# Patient Record
Sex: MALE | Race: WHITE
[De-identification: names, ages, dates, MRNs, and addresses within clinical notes are randomized per-mention and may not be internally consistent; named-entity substitution may affect disease eponyms.]

---

## 2017-05-30 ENCOUNTER — HOSPITAL ENCOUNTER (EMERGENCY)
Dept: HOSPITAL 25 - UCCORT | Age: 52
Discharge: HOME | End: 2017-05-30
Payer: COMMERCIAL

## 2017-05-30 VITALS — DIASTOLIC BLOOD PRESSURE: 78 MMHG | SYSTOLIC BLOOD PRESSURE: 120 MMHG
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
## 2017-05-30 DIAGNOSIS — S83.92XA: Primary | ICD-10-CM

## 2017-05-30 PROCEDURE — 99212 OFFICE O/P EST SF 10 MIN: CPT

## 2017-05-30 PROCEDURE — G0463 HOSPITAL OUTPT CLINIC VISIT: HCPCS

## 2017-05-30 NOTE — UC
Knee Pain HPI





- HPI Summary


HPI Summary: 





LEFT KNEE PAIN AND SWELLING X 6 DAYS 


INJURY TO THE LEFT KNEE AS HE WAS WORKING ON THE ROOF


STEPPED OF AND TWISTED HIS LEFT KNEE, SEVER PAIN MEDIAL KNEE, 





- History of Current Complaint


Chief Complaint: UCLowerExtremity


Stated Complaint: LEFT KNEE INJURY


Time Seen by Provider: 05/30/17 09:34


Hx Obtained From: Patient


Onset/Duration: Sudden Onset, Lasting Days - 6, Still Present


Severity Initially: Moderate


Severity Currently: Moderate


Character: Aching, Stiffness


Aggravating Factor(s): Movement, Weight Bearing, Prolonged Standing, Stairs


Alleviating Factor(s): Rest, Cold


Associated Signs And Symptoms: Positive: Swelling, Weakness.  Negative: Redness

, Bruising, Fever, Numbness





- Allergies/Home Medications


Allergies/Adverse Reactions: 


 Allergies











Allergy/AdvReac Type Severity Reaction Status Date / Time


 


Bee Venom Allergy  Shortness Verified 05/30/17 09:21





   of Breath  


 


Oxycodone Allergy  Unknown Verified 05/30/17 09:21





   Reaction  





   Details  











Home Medications: 


 Home Medications





LevoCETirizine TAB (NF) [Xyzal TAB (NF)] 5 mg PO DAILY 05/30/17 [History 

Confirmed 05/30/17]


Montelukast Sodium TAB* [Singulair TAB*] 10 mg PO DAILY 05/30/17 [History 

Confirmed 05/30/17]











PMH/Surg Hx/FS Hx/Imm Hx


Endocrine History Of: 


   Denies: Diabetes


Cardiovascular History Of: Reports: Hypertension


   Denies: Pacemaker/ICD





- Surgical History


Surgical History: Yes


Surgery Procedure, Year, and Place: CSP SURGERY SPRING 2011, L5 S1 SURGERY X2, 

DEVIATED SEPTUM AND SINUS WORK, LEFT ELBOW 1990





- Family History


Known Family History: 


   Negative: Diabetes





- Social History


Alcohol Use: None


Substance Use Type: None


Smoking Status (MU): Former Smoker


When Did the Patient Quit Smoking/Using Tobacco: 16 years ago





Review of Systems


Constitutional: Negative


Skin: Negative


Eyes: Negative


ENT: Negative


Respiratory: Negative


Cardiovascular: Negative


Gastrointestinal: Negative


Genitourinary: Negative


Motor: Negative


Neurovascular: Negative


Musculoskeletal: Other: - LEFT KNEE PAIN


Neurological: Negative


Psychological: Negative


All Other Systems Reviewed And Are Negative: Yes





Physical Exam


Triage Information Reviewed: Yes


Appearance: Well-Appearing, No Pain Distress, Well-Nourished


Vital Signs: 


 Initial Vital Signs











Temp  97.7 F   05/30/17 09:24


 


Pulse  72   05/30/17 09:24


 


Resp  16   05/30/17 09:24


 


BP  120/78   05/30/17 09:24


 


Pulse Ox  96   05/30/17 09:24











Eye Exam: Normal


Eyes: Positive: Conjunctiva Clear


ENT: Positive: Normal ENT inspection, Hearing grossly normal, Pharynx normal


Neck exam: Normal


Neck: Positive: Supple, Nontender, No Lymphadenopathy


Respiratory: Positive: Chest non-tender, Lungs clear, Normal breath sounds, No 

respiratory distress


Cardiovascular: Positive: RRR, No Murmur, Pulses Normal


Abdominal Exam: Normal


Musculoskeletal: Positive: Other: - LEFT KNEE : + SWELLING , MILD EFFUSION, +

TENDERNESS MEDIAL JOINT LINE, LIMITED ROM ON FLESION AND EXTENSION





Knee Pain Course/Dx





- Differential Dx/Diagnosis


Provider Diagnoses: LEFT KNEE PAIN.  LEFT KNEE SPRAIN





Discharge





- Discharge Plan


Condition: Stable


Disposition: HOME


Patient Education Materials:  Swollen Knee Joint (ED)


Referrals: 


Scott Tian MD [Medical Doctor] - As Soon As Possible


No Primary Care Phys,NOPCP [Primary Care Provider] -

## 2017-05-30 NOTE — RAD
INDICATION: Left knee injury.



TECHNIQUE: 4 views of the left knee were obtained.



FINDINGS:  The bones are in normal alignment. No joint effusion or fracture is seen. 

Joint spaces appear maintained.



IMPRESSION:  NO EVIDENCE FOR FRACTURE.

## 2018-07-17 ENCOUNTER — HOSPITAL ENCOUNTER (EMERGENCY)
Dept: HOSPITAL 25 - UCCORT | Age: 53
Discharge: HOME | End: 2018-07-17
Payer: COMMERCIAL

## 2018-07-17 VITALS — DIASTOLIC BLOOD PRESSURE: 82 MMHG | SYSTOLIC BLOOD PRESSURE: 115 MMHG

## 2018-07-17 DIAGNOSIS — I10: ICD-10-CM

## 2018-07-17 DIAGNOSIS — Z18.89: ICD-10-CM

## 2018-07-17 DIAGNOSIS — H02.814: ICD-10-CM

## 2018-07-17 DIAGNOSIS — Z87.891: ICD-10-CM
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
## 2018-07-17 DIAGNOSIS — S00.251A: ICD-10-CM

## 2018-07-17 DIAGNOSIS — T15.02XA: Primary | ICD-10-CM
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
## 2018-07-17 PROCEDURE — 90715 TDAP VACCINE 7 YRS/> IM: CPT

## 2018-07-17 PROCEDURE — G0463 HOSPITAL OUTPT CLINIC VISIT: HCPCS

## 2018-07-17 PROCEDURE — 99212 OFFICE O/P EST SF 10 MIN: CPT

## 2018-07-17 NOTE — UC
Eye Complaint HPI





- HPI Summary


HPI Summary: 





51 yo male with left eye pain that started this AM upon arising


FB sensation/photophia and tearing


Td >20 yrs ago





- History of Current Complaint


Chief Complaint: UCEye


Stated Complaint: LEFT EYE COMPLAINT


Time Seen by Provider: 07/17/18 14:26


Hx Obtained From: Patient


Onset/Duration: Sudden Onset, Lasting Hours


Timing: Constant


Severity Initially: Mild


Severity Currently: Moderate


Pain Intensity: 6


Pain Scale Used: 0-10 Numeric


Location of Injury: Conjunctiva, Eye Lid (upper)


Character: Foreign Body Sensation


Aggravating Factor(s): Nothing


Alleviating Factor(s): Darkness


Associated Signs And Symptoms: Positive: Photophobia, Drainage (Clear)


Eyes: 


  __________________________














  __________________________





 1 - flourescein uptake








- Risk Factors


Penetrating Injury Risk Factor: Negative


Globe Rupture Risk Factors: Negative


Acute Glaucoma Risk Factors: Negative


Optic Artery Occlusion Risk Factors: Negative





- Allergies/Home Medications


Allergies/Adverse Reactions: 


 Allergies











Allergy/AdvReac Type Severity Reaction Status Date / Time


 


oxycodone AdvReac  hypertensio Verified 07/17/18 14:29





   n  














PMH/Surg Hx/FS Hx/Imm Hx


Previously Healthy: Yes - Hx chronic back issues





- Surgical History


Surgical History: Yes


Surgery Procedure, Year, and Place: CSP SURGERY SPRING 2011, L5 S1 SURGERY X2, 

DEVIATED SEPTUM AND SINUS WORK, LEFT ELBOW 1999.  C6-7 REMOVED AND REPLACED 

WITH BONES AND HARDWARE 2010





- Family History


Known Family History: Positive: Hypertension


   Negative: Diabetes





- Social History


Alcohol Use: Rare


Substance Use Type: None


Smoking Status (MU): Former Smoker


Amount Used/How Often: 1 ppd


Length of Time of Smoking/Using Tobacco: 30+ yrs


Have You Smoked in the Last Year: No


When Did the Patient Quit Smoking/Using Tobacco: 2002





Review of Systems


Constitutional: Negative


Skin: Negative


Eyes: Drainage - tearing, Photophobia


ENT: Negative


Respiratory: Negative


Cardiovascular: Negative


Gastrointestinal: Negative


Genitourinary: Negative


Motor: Negative


Neurovascular: Negative


Musculoskeletal: Negative


Neurological: Negative


Psychological: Negative


Is Patient Immunocompromised?: No


All Other Systems Reviewed And Are Negative: Yes





Physical Exam


Triage Information Reviewed: Yes


Appearance: Well-Appearing, No Pain Distress, Well-Nourished


Vital Signs: 


 Initial Vital Signs











Temp  98 F   07/17/18 14:21


 


Pulse  65   07/17/18 14:21


 


Resp  16   07/17/18 14:21


 


BP  115/82   07/17/18 14:21


 


Pulse Ox  99   07/17/18 14:21











Eyes: Positive: Conjunctiva Clear, Other: - tearing left eye/n o FB noted on 

cornea/upper lid everted and FB noted- removed with q tip, (+) staining defect 

as noted in image


ENT: Positive: Hearing grossly normal.  Negative: Nasal congestion, Nasal 

drainage, Muffled voice, Hoarse voice


Neck: Positive: Supple, Nontender


Respiratory: Positive: Lungs clear, Normal breath sounds, No respiratory 

distress, No accessory muscle use


Cardiovascular: Positive: RRR, No Murmur


Musculoskeletal: Positive: ROM Intact, No Edema


Neurological: Positive: Alert


Psychological Exam: Normal


Skin Exam: Normal





Procedures





- Eye Procedure


  ** Right


Alcaine Drops Administered: Yes - 2 drops tetracaine left eye


Eye FB Removal: removal w/ cotton swab - minute FB under left upper eyelid





Eye Complaint Course/Dx





- Differential Dx/Diagnosis


Provider Diagnoses: left corneal abrasion.  foreign body removal (under left 

upper eyelid)





Discharge





- Sign-Out/Discharge


Documenting (check all that apply): Patient Departure





- Discharge Plan


Condition: Stable


Disposition: HOME


Prescriptions: 


Erythromycin OPHTH.OINT* [Ilotycin OPHTH.OINT*] 1 applic LEFT EYE BEDTIME 3 

Days #1 ophth.oint


Polymyx/Trimethoprim OPTH* [Polytrim OPHTH*] 1 - 2 drop LEFT EYE QID 3 Days #1 

btl


Patient Education Materials:  Eye Foreign Body (ED), Corneal Abrasion (ED)


Referrals: 


Everette Linn MD [Medical Doctor] - 1 Day


Irais Charles MD [Medical Doctor] - 1 Day


Additional Instructions: 


you had a foreign body under your left upper eyelid


as a result you developed multiple abrasions to your left corneal


I suggest you get rechecked by an eye specialist tomorrow unless you are 

completely better





You got a tetanus shot today





- Billing Disposition and Condition


Condition: STABLE


Disposition: Home

## 2019-02-01 ENCOUNTER — HOSPITAL ENCOUNTER (EMERGENCY)
Dept: HOSPITAL 25 - UCCORT | Age: 54
Discharge: HOME | End: 2019-02-01
Payer: COMMERCIAL

## 2019-02-01 VITALS — SYSTOLIC BLOOD PRESSURE: 129 MMHG | DIASTOLIC BLOOD PRESSURE: 77 MMHG

## 2019-02-01 DIAGNOSIS — S16.1XXA: Primary | ICD-10-CM
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
## 2019-02-01 DIAGNOSIS — S50.02XA: ICD-10-CM

## 2019-02-01 DIAGNOSIS — R51: ICD-10-CM

## 2019-02-01 DIAGNOSIS — Z88.5: ICD-10-CM

## 2019-02-01 DIAGNOSIS — Z87.891: ICD-10-CM

## 2019-02-01 PROCEDURE — 99212 OFFICE O/P EST SF 10 MIN: CPT

## 2019-02-01 PROCEDURE — 72125 CT NECK SPINE W/O DYE: CPT

## 2019-02-01 PROCEDURE — G0463 HOSPITAL OUTPT CLINIC VISIT: HCPCS

## 2019-02-01 PROCEDURE — 70450 CT HEAD/BRAIN W/O DYE: CPT

## 2019-02-01 NOTE — UC
Minor Trauma HPI





- HPI Summary


HPI Summary: 


53-year-old male presents with complaints of headache, feeling "off balance", 

posterior neck pain, intermittent bilateral upper extremity numbness, and left 

elbow pain after accidentally falling approximately 4 feet from a ladder at 

work 4 days ago.  States he did not hit his head or lose consciousness.  

Reports ladder tipped over and some unstable ground and he fell onto his left 

elbow and then his back onto snow-covered ground.  Denies visual disturbances, 

slurred or difficulty speaking, weakness of his extremities, chest pain, 

palpitations, shortness of breath, abdominal pain, nausea, vomiting, or other 

injury.








- History of Current Complaint


Chief Complaint: UCGeneralIllness


Stated Complaint: WC-SP FALL,HA,NUMBNESS IN ARMS, BACK PAIN


Time Seen by Provider: 19 13:19


Hx Obtained From: Patient


Pain Intensity: 9





- Allergies/Home Medications


Allergies/Adverse Reactions: 


 Allergies











Allergy/AdvReac Type Severity Reaction Status Date / Time


 


oxycodone AdvReac  hypertensio Verified 19 12:52





   n  











Home Medications: 


 Home Medications





Naproxen Sodium [Aleve] 220 mg PO DAILY PRN 19 [History Confirmed 19

]











PMH/Surg Hx/FS Hx/Imm Hx





- Additional Past Medical History


Additional PMH: 


Seasonal allergies








- Surgical History


Surgical History: Yes


Surgery Procedure, Year, and Place: CSP SURGERY SPRING 2011, L5 S1 SURGERY X2, 

DEVIATED SEPTUM AND SINUS WORK, LEFT ELBOW .  C6-7 REMOVED AND REPLACED 

WITH BONES AND HARDWARE 





- Family History


Known Family History: Positive: Hypertension


   Negative: Diabetes





- Social History


Occupation: Employed Full-time


Lives: With Family


Alcohol Use: None


Substance Use Type: None


Smoking Status (MU): Former Smoker


Amount Used/How Often: 1 ppd


Length of Time of Smoking/Using Tobacco: 30+ yrs


Have You Smoked in the Last Year: No


When Did the Patient Quit Smoking/Using Tobacco: 





Review of Systems


All Other Systems Reviewed And Are Negative: Yes


Constitutional: Negative: Fever, Chills


Skin: Negative: Bruising


Eyes: Negative: Blurred Vision, Diplopia, Photophobia


Respiratory: Negative: Shortness Of Breath, Cough


Cardiovascular: Negative: Palpitations, Chest Pain


Gastrointestinal: Negative: Abdominal Pain, Vomiting, Diarrhea, Nausea


Genitourinary: Negative: Dysuria, Hematuria, Frequency, Urgency


Motor: Negative: Weakness


Neurovascular: Positive: Decreased Sensation


Musculoskeletal: Positive: Other: - See HPI


Neurological: Positive: Headache, Other - "Off balance".  Negative: Weakness, 

Paresthesia, Numbness


Is Patient Immunocompromised?: No





Physical Exam





- Summary


Physical Exam Summary: 


GENERAL APPEARANCE: Well developed, well nourished, alert and cooperative, and 

appears to be in no acute distress.





HEAD: Atraumatic. normocephalic.





EYES: PERRL, EOM intact. Vision is grossly intact.





EARS: External auditory canals and tympanic membranes clear, hearing grossly 

intact.





NOSE: No nasal discharge.





THROAT: Oral cavity and pharynx normal. No inflammation, swelling, exudate, or 

lesions. Teeth and gingiva in good general condition.





NECK: Patient was placed in hard C-collar at time of triage. Assessment of neck 

deferred until after CT evaluation.





CARDIAC: Normal S1 and S2. No S3, S4 or murmurs. Rhythm is regular. There is no 

peripheral edema, cyanosis or pallor. Extremities are warm and well perfused. 

Capillary refill is less than 2 seconds. Peripheral pulses intact.





LUNGS: Clear to auscultation without rales, rhonchi, wheezing or diminished 

breath sounds.





ABDOMEN: Positive bowel sounds. Soft, nondistended, nontender. No guarding or 

rebound. No masses or hepatosplenomegally.





MUSKULOSKELETAL: ROM intact to all extremities. Normal muscular development. 

Normal gait.





BACK: Examination of the spine reveals normal gait and posture, no spinal 

deformity or tenderness, decreased range of motion or muscular spasm.





EXTREMITIES: Tenderness to medial left elbow without gross deformity, ecchymosis

, erythema, or lesions noted.  Full painless active range of motion.  

Circulation and sensation intact distally.





NEUROLOGICAL: CN II-XII intact. Strength and sensation symmetric and intact 

throughout. Reflexes 2+ throughout. Cerebellar testing normal.





SKIN: Skin normal color, texture and turgor with no lesions or eruptions.





Triage Information Reviewed: Yes


Vital Signs: 


 Initial Vital Signs











Temp  97.9 F   19 12:53


 


Pulse  78   19 12:53


 


Resp  16   19 12:53


 


BP  129/77   19 12:53


 


Pulse Ox  99   19 12:53











Vital Signs Reviewed: Yes





Diagnostics





- Radiology


  ** No standard instances


Radiology Interpretation Completed By: Radiologist


Summary of Radiographic Findings: Patient Name: FRANKO RAY Medical 

Record#: [id number].  Ordering Physician: Inocente Gonzales NP Acct.#: 

[id number].  : 1965 Age: 53 Sex: M [place] [facility] [place]

  Exam Date: 19 1329 ADM Status: REG ER.  Order Information: CT BRAIN WO.

  Accession Number: [id number].  CPT: 99697.  INDICATION: Head injury.  

COMPARISON: There are no relevant prior studies available for comparison.  

TECHNIQUE: Contiguous axial sections of the brain were obtained from the skull 

base to the vertex without contrast.  FINDINGS: The ventricles, cisterns and 

sulci are within normal limits. No significant focal abnormality or mass effect 

is seen. There is no evidence for hemorrhage.  No fracture is seen. There is 

circumferential mucosal thickening in the visualized portion of the sphenoid 

sinus. The visualized portion of the paranasal sinuses and mastoid air cells 

otherwise appear clear.  IMPRESSION:  1. NO EVIDENCE FOR ACUTE INTRACRANIAL 

ABNORMALITY.  2. 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  Order Information: CT 

SPINE CERVICAL W/O.  Accession Number: [id number].  CPT: 70010.  HISTORY: pain 

s/p fall. Hx cervical fusion C5-C6-C7.  COMPARISONS: MRI dated 2015.

  TECHNIQUE: Multiple contiguous axial CT scans were obtained of the cervical 

spine without.  intravenous contrast, with coronal and sagittal multiplanar 

reformations.  FINDINGS:  BRAIN: The visualized brain is unremarkable.  CENTRAL 

CANAL: Evaluation of the central canal is limited on CT technique; however, 

there.  is no obvious canalicular mass or epidural hemorrhage.  ALIGNMENT: The 

alignment is normal, without subluxation or dislocation.  VERTEBRAL BODIES: The 

patient is status post anterior cervical fusion at C5, C6, and C7.  There is no 

appreciable hardware failure or osteolysis. There is no displaced fracture.  

There is multilevel anterolateral marginal osteophyte formation.  JOINTS: There 

is uncovertebral hypertrophy with mild facet osteoarthritis.  MUSCULATURE: 

Unremarkable.  INTERVERTEBRAL DISCS: There is diffuse loss of intervertebral 

disc height.  AXIAL IMAGES:  C2-C3: There is no osseous neural foraminal 

narrowing or central canal stenosis.  C3-C4: There is no osseous neural 

foraminal narrowing or central canal stenosis.  C4-C5: There is moderate 

bilateral neuroforaminal narrowing. There is no osseous central canal stenosis.

  C5-C6: There is a broad-based disc osteophyte complex with mild narrowing of 

central canal. There is no significant neuroforaminal narrowing.  C6-C7: There 

is no osseous neural foraminal narrowing or central canal stenosis.  C7-T1: 

There is no osseous neural foraminal narrowing or central canal stenosis.  SOFT 

TISSUES: The visualized soft tissues of the neck are unremarkable. The 

prevertebral fat stripe is preserved.  OTHER: None.  IMPRESSION:  STATUS POST 

ANTERIOR CERVICAL FUSION.  NO ACUTE OSSEOUS INJURY TO THE CERVICAL SPINE.  

Order Information: ELBOW LEFT 3+VWS.  Accession Number: [id number].  CPT: 

02043.  Indication: Left elbow pain after fall.  4 views of left elbow 

demonstrates no fracture or dislocation. No other bone or joint abnormality is 

identified.  IMPRESSION: No fracture of the left elbow is noted.





Re-Evaluation





- Re-Evaluation


  ** First Eval


Re-Evaluation Time: 14:22


Comment: CT head, C-spine, and x-ray of his left elbow were all normal.  

Reviewed findings with patient.  C-collar was removed.  He has right cervical 

soft tissue tenderness with palpation.  No point tenderness over the cervical 

spine.  Remainder of the exam is unchanged from previous.





Minor Trauma Course/Dx





- Course


Course Of Treatment: 53-year-old male presents with complaints of headache, 

feeling "off balance", posterior neck pain, intermittent bilateral upper 

extremity numbness, and left elbow pain after accidentally falling 

approximately 4 feet from a ladder at work 4 days ago.  States he did not hit 

his head or lose consciousness.  Reports ladder tipped over and some unstable 

ground and he fell onto his left elbow and then his back onto snow-covered 

ground. Patient states he was ambulatory immediately after the incident and at 

presentation here in the clinic.  Denies visual disturbances, slurred or 

difficulty speaking, weakness of his extremities, chest pain, palpitations, 

shortness of breath, abdominal pain, nausea, vomiting, or other injury.  

Afebrile. Vital signs stable.  Patient was placed in a hard c-collar at triage.

  On exam he was alert, appropriate, and appeared in no acute distress.  His 

exam revealed some tenderness to the medial aspect of his left elbow with full 

active painless range of motion and intact circulation and sensation, full 

examination of the cervical spine was deferred until CT imaging was completed, 

otherwise exam was unremarkable.  CT head, C-spine, and left elbow x-ray were 

reviewed by the radiologist and read as normal.  X-ray results were reviewed 

with the patient and wife.  Patient was removed from the c-collar and exam of 

the neck was performed.  Patient has soft tissue tenderness over the right 

cervical spine with no point tenderness over the cervical spine itself.  

Remainder of exam was unchanged.  Recommending conservative treatment for a 

cervical strain, headache, and left elbow contusion.  He is to follow-up with 

occupational medicine in one week for recheck of symptoms.  Anticipatory 

guidance and warning symptoms were reviewed with the patient.  Verbalizes 

understanding and agrees with plan of care.





- Differential Dx/Diagnosis


Differential Diagnosis/HQI/PQRI: Contusion(s), Fracture, Hematoma(s), Sprain, 

Strain


Provider Diagnosis: 


 Cervical strain, acute, Headache, Left elbow contusion, Fall from ladder








Discharge





- Sign-Out/Discharge


Documenting (check all that apply): Patient Departure


All imaging exams completed and their final reports reviewed: Yes





- Discharge Plan


Condition: Stable


Disposition: HOME


Patient Education Materials:  Cervical Strain (ED), Acute Headache (ED), 

Contusion in Adults (ED)


Referrals: 


No Primary Care Phys,NOPCP [Primary Care Provider] - 


Dallas Mckeon MD [Medical Doctor] - 1 Week (For recheck of symptoms.)


Additional Instructions: 


Your CT scan of the head, C-spine, and left elbow x-ray were all normal.  I 

suspect her symptoms are from a cervical strain and contusion of the left elbow.





Take naproxen (Aleve) according to directions as needed for pain.





Follow-up with Dr. Mckeon, occupational medicine, in 7 days for recheck of 

symptoms.





Seek immediate medical attention in the emergency room if you develop a sudden 

severe headache, visual disturbances, slurred or difficulty speaking, facial 

droop, weakness of extremities, chest pain, shortness of breath, abdominal pain

, persistent vomiting, or any worsening of symptoms.





- Billing Disposition and Condition


Condition: STABLE


Disposition: Home

## 2019-11-04 ENCOUNTER — HOSPITAL ENCOUNTER (EMERGENCY)
Dept: HOSPITAL 25 - UCCORT | Age: 54
Discharge: HOME | End: 2019-11-04
Payer: COMMERCIAL

## 2019-11-04 VITALS — SYSTOLIC BLOOD PRESSURE: 120 MMHG | DIASTOLIC BLOOD PRESSURE: 77 MMHG

## 2019-11-04 DIAGNOSIS — S01.412A: Primary | ICD-10-CM

## 2019-11-04 DIAGNOSIS — Z23: ICD-10-CM

## 2019-11-04 DIAGNOSIS — Z91.030: ICD-10-CM

## 2019-11-04 DIAGNOSIS — Z87.891: ICD-10-CM

## 2019-11-04 DIAGNOSIS — Z88.5: ICD-10-CM
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
## 2019-11-04 PROCEDURE — 90715 TDAP VACCINE 7 YRS/> IM: CPT

## 2019-11-04 PROCEDURE — G0463 HOSPITAL OUTPT CLINIC VISIT: HCPCS

## 2019-11-04 PROCEDURE — 12011 RPR F/E/E/N/L/M 2.5 CM/<: CPT

## 2019-11-04 PROCEDURE — 90471 IMMUNIZATION ADMIN: CPT

## 2019-11-04 PROCEDURE — 99212 OFFICE O/P EST SF 10 MIN: CPT

## 2019-11-04 NOTE — UC
Laceration HPI





- HPI Summary


HPI Summary: 





Hit his left cheek against a metal pipe a short while ago. Comes for assessment 

and would like a tetanus injection as he cannot recall the date he last had 

one. 


Wears dentures. 





- History Of Current Complaint


Chief Complaint: UCLaceration


Stated Complaint: LEFT SIDE FACIAL LACERATION


Time Seen by Provider: 11/04/19 13:02


Hx Obtained From: Patient


Laceration Location: Face


Mechanism Of Injury: Blunt Trauma


Severity: Mild


Pain Intensity: 8


Aggravating Factors: Other: - chewing





- Allergies/Home Medications


Allergies/Adverse Reactions: 


 Allergies











Allergy/AdvReac Type Severity Reaction Status Date / Time


 


bee venom protein (honey bee) Allergy  Vomiting Verified 11/04/19 12:47


 


oxycodone AdvReac  hypertensio Verified 11/04/19 12:47





   n  











Home Medications: 


 Home Medications





guaiFENesin [Mucinex] 600 mg PO BID 11/04/19 [History Confirmed 11/04/19]











PMH/Surg Hx/FS Hx/Imm Hx


Previously Healthy: Yes





- Surgical History


Surgical History: Yes


Surgery Procedure, Year, and Place: CSP SURGERY SPRING 2011, L5 S1 SURGERY X2, 

DEVIATED SEPTUM AND SINUS WORK, LEFT ELBOW 1999.  C6-7 REMOVED AND REPLACED 

WITH BONES AND HARDWARE 2010





- Family History


Known Family History: Positive: Hypertension, Diabetes - mother, Other - mother 

has dementia





- Social History


Occupation: Employed Full-time


Lives: With Family


Alcohol Use: None


Substance Use Type: None


Smoking Status (MU): Former Smoker


Amount Used/How Often: 1 ppd


Length of Time of Smoking/Using Tobacco: 30+ yrs


Have You Smoked in the Last Year: No


When Did the Patient Quit Smoking/Using Tobacco: 2002





Review of Systems


All Other Systems Reviewed And Are Negative: Yes


Constitutional: Positive: Negative


Skin: Positive: Negative


Eyes: Positive: Negative


ENT: Positive: Negative


Respiratory: Positive: Negative


Cardiovascular: Positive: Negative


Gastrointestinal: Positive: Negative


Genitourinary: Positive: Negative


Motor: Positive: Negative


Neurovascular: Positive: Negative


Musculoskeletal: Positive: Negative


Neurological: Positive: Negative


Psychological: Positive: Negative


Is Patient Immunocompromised?: No





Physical Exam


Triage Information Reviewed: Yes


Appearance: Well-Appearing, No Pain Distress


Vital Signs: 


 Initial Vital Signs











Temp  98.1 F   11/04/19 12:43


 


Pulse  74   11/04/19 12:43


 


Resp  15   11/04/19 12:43


 


BP  120/77   11/04/19 12:43


 


Pulse Ox  98   11/04/19 12:43











Eye Exam: Other - ISELA, normal eom.


Eyes: Positive: Conjunctiva Clear


ENT Exam: Other - no facial swelling or ecchymosis noted, face symmetrical.


ENT: Positive: Pharynx normal, Other - dentures removed --no evidence of 

through and through laceration. Small abraded area 2-3 mm buccal membrane.


Dental Exam: Other - dentures.


Neck: Positive: Supple, Nontender, No Lymphadenopathy


Respiratory: Positive: Lungs clear, Normal breath sounds


Cardiovascular: Positive: RRR, No Murmur


Skin Exam: Other - laceration left cheek skin fold, inverted L shape.





Laceration Repair





- Laceration Repair


  ** 1


Description: Irregular


Closure Material: Skin Adhesive, SteriStrips





Laceration Course/Dx





- Course/Dx


Course Of Treatment: 





adhesive and steristrips, wound care reviewed, tetanus booster given





- Differential Dx - Laceration/Wound


Differental Diagnoses: Laceration, Other - contusion





- Diagnosis


Provider Diagnosis: 


 Laceration of left cheek








Discharge ED





- Sign-Out/Discharge


Documenting (check all that apply): Patient Departure


All imaging exams completed and their final reports reviewed: No Studies





- Discharge Plan


Condition: Stable


Disposition: HOME


Patient Education Materials:  Facial Laceration (ED), Diphtheria/Acellular 

Pertussis/Tetanus Booster Vaccine (By injection)


Referrals: 


No Primary Care Phys,NOPCP [Primary Care Provider] - 


Additional Instructions: 


You have had a tetanus-diphtheria-pertussis booster today, which is effective 

for 10 years. 


The steristrips will gradually peel away; try to keep them clean and dry for 

the next several days. 





- Billing Disposition and Condition


Condition: STABLE


Disposition: Home